# Patient Record
Sex: FEMALE | Race: WHITE | NOT HISPANIC OR LATINO | URBAN - METROPOLITAN AREA
[De-identification: names, ages, dates, MRNs, and addresses within clinical notes are randomized per-mention and may not be internally consistent; named-entity substitution may affect disease eponyms.]

---

## 2020-12-02 NOTE — PATIENT DISCUSSION
vs Infiltrative v CL over wear related keratitis. Highly suspicious of HSV, due to loss of corneal sensitivity os and dendritic appearance with staining.

## 2020-12-08 NOTE — PATIENT DISCUSSION
Reduce Zirgan x2/day OS for 2 days. Besivance BID OS for 5 days.  ATs x3/day OS . Keep 3-4 mins between drops.

## 2020-12-14 NOTE — PATIENT DISCUSSION
Trial lenses dispensed. Discussed refitting to dailies to reduce risk of infection/improve CL hygiene. And to keep 2 weekly colored cl rx for social use.

## 2020-12-30 NOTE — PATIENT DISCUSSION
vs Infiltrative v CL over wear related keratitis. Highly suspicious of HSV, due to loss of corneal sensitivity os and dendritic appearance with staining. Niacinamide Counseling: I recommended taking niacin or niacinamide, also know as vitamin B3, twice daily. Recent evidence suggests that taking vitamin B3 (500 mg twice daily) can reduce the risk of actinic keratoses and non-melanoma skin cancers. Side effects of vitamin B3 include flushing and headache.

## 2021-06-07 NOTE — PATIENT DISCUSSION
DRY EYE SYNDROME/BLEPHARITIS OU, OD&gt;OS: EDUCATED PATIENT ON FINDINGS AND CONDITION. PRESCRIBE ARTIFICIAL TEARS BID/PRN OU AND WARM COMPRESSES QD-BID OU. DISCUSSED NO EYE RUBBING OR MUCOUS FISHING. ADVISED TO RTC IF SI/SX PERSIST OR WORSEN. MONITOR.

## 2021-06-07 NOTE — PATIENT DISCUSSION
ALLERGIC CONJUNCTIVITIS OU: EDUCATED PATIENT ON FINDINGS. PATIENT ASYMPTOMATIC FOR ITCHING. RECOMMENDED OTC PATADAY BID/PRN OU IF SYMPTOMS ARISE. MONITOR.

## 2021-06-07 NOTE — PATIENT DISCUSSION
GLAUCOMA SUSPECT OU: HISTORY OF DROPS FOR ELEVATED IOP OS PER PATIENT. IOP WITHIN NORMAL LIMITS WITH NORMAL OCT RNFL TODAY OU. NO TREATMENT INDICATED AT THIS TIME. MONITOR.

## 2022-04-08 ENCOUNTER — NEW PATIENT (OUTPATIENT)
Dept: URBAN - METROPOLITAN AREA CLINIC 52 | Facility: CLINIC | Age: 67
End: 2022-04-08

## 2022-04-08 DIAGNOSIS — H25.13: ICD-10-CM

## 2022-04-08 PROCEDURE — 92015 DETERMINE REFRACTIVE STATE: CPT

## 2022-04-08 PROCEDURE — 92004 COMPRE OPH EXAM NEW PT 1/>: CPT

## 2022-04-08 ASSESSMENT — VISUAL ACUITY
OD_GLARE: 20/40
OD_CC: 20/20-1
OU_CC: J1+
OS_SC: 20/200
OS_CC: 20/20
OS_GLARE: 20/50
OU_SC: 20/200
OD_SC: 20/200
OU_CC: 20/20
OS_CC: J1+
OD_CC: J1+
OD_SC: J7
OU_SC: J7
OS_SC: J8
OS_GLARE: 20/30
OD_GLARE: 20/50

## 2022-04-08 ASSESSMENT — TONOMETRY
OS_IOP_MMHG: 17
OD_IOP_MMHG: 17

## 2022-04-13 ENCOUNTER — DIAGNOSTICS ONLY (OUTPATIENT)
Dept: URBAN - METROPOLITAN AREA CLINIC 52 | Facility: CLINIC | Age: 67
End: 2022-04-13

## 2022-04-13 DIAGNOSIS — H25.13: ICD-10-CM

## 2022-04-13 PROCEDURE — 92136 OPHTHALMIC BIOMETRY: CPT

## 2022-04-13 PROCEDURE — 92025IOL CORNEAL TOPOGRAPHY PREMIUM IOL

## 2022-04-13 ASSESSMENT — KERATOMETRY
OS_AXISANGLE_DEGREES: 130
OD_K1POWER_DIOPTERS: 44.87
OS_AXISANGLE2_DEGREES: 40
OS_K2POWER_DIOPTERS: 44.62
OS_K1POWER_DIOPTERS: 44.75
OD_AXISANGLE2_DEGREES: 009
OD_K2POWER_DIOPTERS: 44.62
OD_AXISANGLE_DEGREES: 99

## 2022-07-14 ENCOUNTER — PRE-OP/H&P (OUTPATIENT)
Dept: URBAN - METROPOLITAN AREA CLINIC 52 | Facility: CLINIC | Age: 67
End: 2022-07-14

## 2022-07-14 DIAGNOSIS — H43.813: ICD-10-CM

## 2022-07-14 DIAGNOSIS — H25.11: ICD-10-CM

## 2022-07-14 PROCEDURE — 92134 CPTRZ OPH DX IMG PST SGM RTA: CPT

## 2022-07-14 PROCEDURE — PREOP PRE OP VISIT

## 2022-07-14 ASSESSMENT — VISUAL ACUITY
OD_CC: 20/25
OS_CC: 20/20

## 2022-07-14 ASSESSMENT — TONOMETRY
OD_IOP_MMHG: 16
OS_IOP_MMHG: 17

## 2022-07-14 NOTE — PATIENT DISCUSSION
BASIC OD/OS/DIST OU: Discussed with patient in detail laser-assisted vs traditional cataract surgery and all available lens options as well as their associated risks, benefits, limitations and out-of-pocket costs. Patient wishes to proceed with cataract surgery with the Basic option OD. Proceed with cataract surgery OS with Basic option, based on confirmation of first eye results, and patient's complaint. The patient understands that a monofocal IOL will allow him/her to see clearly at one focal point and elects to be best corrected at distance. Patient understands that glasses will be needed full time for near and intermediate work after surgery and will likely still be needed to see their best at distance. The risks, benefits, and alternatives to surgery were discussed and the patient's questions were answered.

## 2022-07-20 ENCOUNTER — POST-OP (OUTPATIENT)
Dept: URBAN - METROPOLITAN AREA CLINIC 52 | Facility: CLINIC | Age: 67
End: 2022-07-20

## 2022-07-20 ENCOUNTER — SURGERY/PROCEDURE (OUTPATIENT)
Dept: URBAN - METROPOLITAN AREA SURGERY 16 | Facility: SURGERY | Age: 67
End: 2022-07-20

## 2022-07-20 DIAGNOSIS — Z96.1: ICD-10-CM

## 2022-07-20 DIAGNOSIS — Z98.41: ICD-10-CM

## 2022-07-20 DIAGNOSIS — H25.11: ICD-10-CM

## 2022-07-20 PROCEDURE — 66984 XCAPSL CTRC RMVL W/O ECP: CPT

## 2022-07-20 RX ORDER — BRINZOLAMIDE/BRIMONIDINE TARTRATE 10; 2 MG/ML; MG/ML: 1 SUSPENSION/ DROPS OPHTHALMIC

## 2022-07-20 ASSESSMENT — VISUAL ACUITY
OD_SC: 20/80
OD_SC: 20/40

## 2022-07-20 ASSESSMENT — TONOMETRY
OD_IOP_MMHG: 15
OD_IOP_MMHG: 34
OD_IOP_MMHG: 35

## 2022-07-20 NOTE — PATIENT DISCUSSION
She returned to office with pain. Her IOP was elevated. Pressure was released with use of spud by BLAKE. Will have her start Lorette Ort three times a day in the right eye until 1 week follow up.

## 2022-07-28 ENCOUNTER — POST OP/EVAL OF SECOND EYE (OUTPATIENT)
Dept: URBAN - METROPOLITAN AREA CLINIC 52 | Facility: CLINIC | Age: 67
End: 2022-07-28

## 2022-07-28 DIAGNOSIS — Z96.1: ICD-10-CM

## 2022-07-28 DIAGNOSIS — H25.12: ICD-10-CM

## 2022-07-28 DIAGNOSIS — Z98.41: ICD-10-CM

## 2022-07-28 PROCEDURE — 99213 OFFICE O/P EST LOW 20 MIN: CPT

## 2022-07-28 PROCEDURE — 92136 - 2N OPHTHALMIC BIOMETRY BY PARTIAL COHERENCE INTERFEROMETRY WITH INTRAOCULAR LENS POWER CALCULATION

## 2022-07-28 ASSESSMENT — TONOMETRY
OS_IOP_MMHG: 19
OD_IOP_MMHG: 12

## 2022-07-28 ASSESSMENT — VISUAL ACUITY
OD_SC: 20/20
OS_PH: 20/40
OD_SC: J10 @ 14IN
OS_SC: 20/400

## 2022-08-03 ENCOUNTER — POST-OP (OUTPATIENT)
Dept: URBAN - METROPOLITAN AREA CLINIC 52 | Facility: CLINIC | Age: 67
End: 2022-08-03

## 2022-08-03 ENCOUNTER — SURGERY/PROCEDURE (OUTPATIENT)
Dept: URBAN - METROPOLITAN AREA SURGERY 16 | Facility: SURGERY | Age: 67
End: 2022-08-03

## 2022-08-03 DIAGNOSIS — Z96.1: ICD-10-CM

## 2022-08-03 DIAGNOSIS — Z98.42: ICD-10-CM

## 2022-08-03 DIAGNOSIS — H25.12: ICD-10-CM

## 2022-08-03 PROCEDURE — 66984 XCAPSL CTRC RMVL W/O ECP: CPT

## 2022-08-03 PROCEDURE — 99024 POSTOP FOLLOW-UP VISIT: CPT

## 2022-08-03 ASSESSMENT — VISUAL ACUITY: OS_SC: 20/80

## 2022-08-03 ASSESSMENT — TONOMETRY: OS_IOP_MMHG: 24

## 2022-08-03 NOTE — PATIENT DISCUSSION
Patient has a history of steroid response provided samples of Dorzolamide HCI and Timolol Maleate  if patient comes symptomatic.

## 2022-08-11 ENCOUNTER — POST-OP (OUTPATIENT)
Dept: URBAN - METROPOLITAN AREA CLINIC 52 | Facility: CLINIC | Age: 67
End: 2022-08-11

## 2022-08-11 DIAGNOSIS — Z98.42: ICD-10-CM

## 2022-08-11 DIAGNOSIS — Z96.1: ICD-10-CM

## 2022-08-11 PROCEDURE — 99024 POSTOP FOLLOW-UP VISIT: CPT

## 2022-08-11 ASSESSMENT — VISUAL ACUITY
OS_SC: 20/20
OD_SC: 20/20

## 2022-08-11 ASSESSMENT — TONOMETRY
OS_IOP_MMHG: 15
OD_IOP_MMHG: 14

## 2022-09-01 ENCOUNTER — POST-OP (OUTPATIENT)
Dept: URBAN - METROPOLITAN AREA CLINIC 52 | Facility: CLINIC | Age: 67
End: 2022-09-01

## 2022-09-01 DIAGNOSIS — Z98.41: ICD-10-CM

## 2022-09-01 DIAGNOSIS — Z96.1: ICD-10-CM

## 2022-09-01 DIAGNOSIS — H43.813: ICD-10-CM

## 2022-09-01 DIAGNOSIS — Z98.42: ICD-10-CM

## 2022-09-01 PROCEDURE — 92015 DETERMINE REFRACTIVE STATE: CPT

## 2022-09-01 PROCEDURE — 99024 POSTOP FOLLOW-UP VISIT: CPT

## 2022-09-01 ASSESSMENT — VISUAL ACUITY
OS_SC: 20/20-1
OD_SC: 20/20

## 2022-09-01 ASSESSMENT — TONOMETRY
OD_IOP_MMHG: 14
OS_IOP_MMHG: 15

## 2022-09-01 NOTE — PATIENT DISCUSSION
Problem: Infection  Goal: Will remain free from infection  Outcome: PROGRESSING AS EXPECTED     Problem: Bowel/Gastric:  Goal: Normal bowel function is maintained or improved  Outcome: PROGRESSING AS EXPECTED  Goal: Will not experience complications related to bowel motility  Outcome: PROGRESSING AS EXPECTED      Small papilloma about 1mm in size. Not bothering patient at this time.

## 2023-09-07 NOTE — PATIENT DISCUSSION
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 - Plan : New contact lenses were prescribed to correct visual acuity and function. New glasses were prescribed to correct visual acuity and function.  Patient is to return to office for a refraction check in one week Chief Complaint   Patient presents with   • Abdominal Pain     Recently seen in [facility] for abdominal pain. Pt reports still having pain       HPI  Ms. Cope is a 78 yo female with a PMH of HTN, HLD, GERD who presents today with a CC of \"belly pain\".    Pt was previously seen in clinic on 8/22/23 for similar symptoms. CBC, CMP, TSH, Lipase, and Stool O&P was completed given recent travel to [place]. All workup was negative. Protonix and GI consult was recommended.      Pt reports going to urgent care 1 week ago due to severe abdominal pain. Follow-up with PCP with interim OTC pain management was recommended.     During visit today, pt expresses continued diffuse, uncomfortable belly pain with onset of 2 months ago. Pain is described as a constant, 7/10 pain. Pt endorses inability to sleep at night due to pain and feels bloated. Expresses having a BM everyday, consisting of a small amount of watery stool. Pain is worsened after a BM/urination. Reports taking a [place] digestive pill similar to pepto bismol intermittently, which she says helps in the moment, but causes nausea. Continues to have an appetite and is eating well. Reports seeing a small amount of blood with wiping a couple days ago, which she attributed to her hemorrhoids. Pt is concerned today that she has cancer and is \"growing a tumor\".    Review of Systems   Constitutional: Positive for fatigue. Negative for chills, fever and unexpected weight change.   Respiratory: Negative for cough and shortness of breath.    Cardiovascular: Negative for chest pain and palpitations.   Gastrointestinal: Positive for abdominal pain, diarrhea and nausea. Negative for vomiting.   Genitourinary: Negative for dysuria and urgency.   Neurological: Positive for headaches. Negative for light-headedness.       ALLERGIES:   Allergen Reactions   • Clarithromycin PRURITUS, HEADACHES, HIVES and Other (See Comments)   • Azithromycin HIVES   • Contrast Media HIVES and Other (See  Comments)   • Entex La Other (See Comments)   • Erythromycin HEADACHES, Other (See Comments) and HIVES   • Fluzone Other (See Comments)   • Iodinated Diagnostic Agents Nausea & Vomiting and HIVES     hives     • Isoptin Sr Other (See Comments)   • Metoprolol PRURITUS   • Terazosin PRURITUS and Other (See Comments)   • Verapamil Cough   • Atorvastatin PRURITUS and Other (See Comments)     Able to take pravastatin     • Pseudoephedrine Tannate PRURITUS        Current Outpatient Medications   Medication Sig Dispense Refill   • chlorhexidine gluconate (PERIDEX) 0.12 % solution SWISH AND SPIT WITH 1/2 OZ FOR 30 SEC TWICE A DAY     • pantoprazole (PROTONIX) 40 MG tablet Take 40 mg by mouth daily.     • losartan (COZAAR) 50 MG tablet Take 50 mg by mouth daily.     • pravastatin (PRAVACHOL) 20 MG tablet Take 20 mg by mouth daily.     • levobunolol (BETAGAN) 0.5 % ophthalmic solution Place 1 drop into both eyes daily.      • metoPROLOL succinate (TOPROL-XL) 25 MG 24 hr tablet Take 25 mg by mouth daily.       No current facility-administered medications for this visit.       Immunization History   Administered Date(s) Administered   • COVID Pfizer 12Y+ (Requires Dilution) 03/04/2021, 03/25/2021   • Influenza Whole 10/21/2014   • Pneumococcal Conjugate 7 Valent 12/20/2011   • Pneumococcal Polysaccharide Vacc (Pneumovax 23) 01/10/2019       Patient Active Problem List   Diagnosis   • Generalized abdominal pain   • Diarrhea       Past Medical History:   Diagnosis Date   • Essential (primary) hypertension    • Gastroesophageal reflux disease    • High cholesterol         Past Surgical History:   Procedure Laterality Date   • Abdomen surgery      adrenal glands removed   • Back surgery     • Hysterectomy     • Ligation of hemorrhoid(s) rubber band     • Pr  uv protect/ ineffec nutrit supple for cataracts      bilateral   • Vaginectomy partial w/nodes     • Vascular surgery      vericose veins ,sclerosing        Family  History   Problem Relation Age of Onset   • Cancer, Thyroid Sister    • Cancer, Liver Brother    • Cancer, Breast Neg Hx    • Cancer, Colon Neg Hx    • Cancer, Ovarian Neg Hx        Social History     Socioeconomic History   • Marital status:      Spouse name: Not on file   • Number of children: Not on file   • Years of education: Not on file   • Highest education level: Not on file   Occupational History   • Not on file   Tobacco Use   • Smoking status: Never   • Smokeless tobacco: Never   Vaping Use   • Vaping Use: never used   Substance and Sexual Activity   • Alcohol use: Never   • Drug use: Never   • Sexual activity: Yes     Partners: Male     Birth control/protection: Post-menopausal   Other Topics Concern   • Not on file   Social History Narrative   • Not on file     Social Determinants of Health     Financial Resource Strain: Not on file   Food Insecurity: Not on file   Transportation Needs: Not on file   Physical Activity: Not on file   Stress: Not on file   Social Connections: Not on file   Intimate Partner Violence: Not At Risk (6/14/2021)    Intimate Partner Violence    • Social Determinants: Intimate Partner Violence Past Fear: No    • Social Determinants: Intimate Partner Violence Current Fear: No       Visit Vitals  /75 (BP Location: RUE - Right upper extremity, Patient Position: Sitting, Cuff Size: Regular)   Pulse 62   Temp 98.2 °F (36.8 °C) (Tympanic)   Ht 5' 1\" (1.549 m)   Wt 67.1 kg (148 lb)   BMI 27.96 kg/m²       Physical Exam  Constitutional:       General: She is not in acute distress.     Appearance: Normal appearance.   HENT:      Head: Normocephalic and atraumatic.      Nose: Nose normal.      Mouth/Throat:      Mouth: Mucous membranes are moist.      Pharynx: Oropharynx is clear. No oropharyngeal exudate.   Cardiovascular:      Rate and Rhythm: Normal rate and regular rhythm.      Pulses: Normal pulses.      Heart sounds: Normal heart sounds.   Pulmonary:      Effort:  Pulmonary effort is normal.      Breath sounds: Normal breath sounds.   Abdominal:      General: Bowel sounds are normal.      Hernia: No hernia is present.      Comments: Diffusely ttp. Significantly worse ttp noted in LLQ/LUQ.   Musculoskeletal:      Right lower leg: No edema.      Left lower leg: No edema.   Skin:     General: Skin is warm and dry.   Neurological:      General: No focal deficit present.      Mental Status: She is alert and oriented to person, place, and time.       Assessment & Plan  Generalized abdominal pain  Abdominal tenderness of left lower quadrant, rebound tenderness presence not specified  - Given 2 month history of symptoms w/ reported sleep disturbance due to pain, CT abdomen/pelvis with oral contrast ordered.   - Recommended pt setup an appointment with her established GI Dr. Jones.   - Referred pt to Colorectal Surgery given 2022 colonoscopy results indicating grade 3 internal hemorrhoids and diverticulosis.   - Continue taking Protonix.   - Medicare wellness visit and follow-up scheduled for Sept 19, 2023.     Watery diarrhea  - Given recent travel history, GIPP and C Diff PCR ordered.     Discussed with attending, Dr. Beal.    Wilber Melara DO, MPH  Internal Medicine, PGY-1  [employer] to contact